# Patient Record
Sex: FEMALE
[De-identification: names, ages, dates, MRNs, and addresses within clinical notes are randomized per-mention and may not be internally consistent; named-entity substitution may affect disease eponyms.]

---

## 2022-11-28 ENCOUNTER — NURSE TRIAGE (OUTPATIENT)
Dept: OTHER | Facility: CLINIC | Age: 37
End: 2022-11-28

## 2022-11-29 NOTE — TELEPHONE ENCOUNTER
[facility]    Received call from Claudio Landin at [facility] Name: Charles Murillo MRN: [id number]    Subjective: Caller states \"cold symptoms\"     Current Symptoms:   Sneezing  Cough   Fever 100  Decreased fetal movement  25 weeks pregnant     Onset:     Yesterday    Pain Severity:   None     What has been tried:   OTC cough and cold medicine    What makes it better or worse:   None     Triage indicates for patient to go to L&D - RN called [facility] to notify L&D of incoming patient     Care advice provided, patient verbalizes understanding; denies any other questions or concerns; instructed to call back for any new or worsening symptoms.     Reason for Disposition   [1] Pregnant 23 or more weeks AND [2] baby is moving less today (e.g., kick count < 5 in 1 hour or < 10 in 2 hours)    Protocols used: Pregnancy - Fever-ADULT-AH

## 2023-03-09 ENCOUNTER — NURSE TRIAGE (OUTPATIENT)
Dept: OTHER | Facility: CLINIC | Age: 38
End: 2023-03-09